# Patient Record
Sex: FEMALE | Race: WHITE | URBAN - METROPOLITAN AREA
[De-identification: names, ages, dates, MRNs, and addresses within clinical notes are randomized per-mention and may not be internally consistent; named-entity substitution may affect disease eponyms.]

---

## 2024-04-26 ENCOUNTER — INPATIENT (INPATIENT)
Facility: HOSPITAL | Age: 69
LOS: 0 days | End: 2024-04-26
Attending: INTERNAL MEDICINE | Admitting: INTERNAL MEDICINE
Payer: MEDICARE

## 2024-04-26 VITALS
HEART RATE: 125 BPM | WEIGHT: 132.28 LBS | OXYGEN SATURATION: 50 % | RESPIRATION RATE: 14 BRPM | DIASTOLIC BLOOD PRESSURE: 82 MMHG | SYSTOLIC BLOOD PRESSURE: 108 MMHG

## 2024-04-26 VITALS — RESPIRATION RATE: 31 BRPM | OXYGEN SATURATION: 64 % | HEART RATE: 60 BPM

## 2024-04-26 DIAGNOSIS — I46.9 CARDIAC ARREST, CAUSE UNSPECIFIED: ICD-10-CM

## 2024-04-26 LAB
A1C WITH ESTIMATED AVERAGE GLUCOSE RESULT: 5.4 % — SIGNIFICANT CHANGE UP (ref 4–5.6)
ADD ON TEST-SPECIMEN IN LAB: SIGNIFICANT CHANGE UP
ALBUMIN SERPL ELPH-MCNC: 2 G/DL — LOW (ref 3.3–5)
ALBUMIN SERPL ELPH-MCNC: 3.9 G/DL — SIGNIFICANT CHANGE UP (ref 3.3–5)
ALP SERPL-CCNC: 178 U/L — HIGH (ref 40–120)
ALP SERPL-CCNC: 219 U/L — HIGH (ref 40–120)
ALT FLD-CCNC: 175 U/L — HIGH (ref 4–33)
ALT FLD-CCNC: 23 U/L — SIGNIFICANT CHANGE UP (ref 4–33)
ANION GAP SERPL CALC-SCNC: 28 MMOL/L — HIGH (ref 7–14)
ANION GAP SERPL CALC-SCNC: 33 MMOL/L — HIGH (ref 7–14)
APTT BLD: 62.5 SEC — HIGH (ref 24.5–35.6)
APTT BLD: >200 SEC — CRITICAL HIGH (ref 24.5–35.6)
AST SERPL-CCNC: 277 U/L — HIGH (ref 4–32)
AST SERPL-CCNC: 32 U/L — SIGNIFICANT CHANGE UP (ref 4–32)
BASE EXCESS BLDV CALC-SCNC: SIGNIFICANT CHANGE UP MMOL/L (ref -2–3)
BASOPHILS # BLD AUTO: 0.23 K/UL — HIGH (ref 0–0.2)
BASOPHILS NFR BLD AUTO: 1.6 % — SIGNIFICANT CHANGE UP (ref 0–2)
BILIRUB SERPL-MCNC: 0.3 MG/DL — SIGNIFICANT CHANGE UP (ref 0.2–1.2)
BILIRUB SERPL-MCNC: 0.4 MG/DL — SIGNIFICANT CHANGE UP (ref 0.2–1.2)
BLD GP AB SCN SERPL QL: NEGATIVE — SIGNIFICANT CHANGE UP
BLOOD GAS ARTERIAL COMPREHENSIVE RESULT: SIGNIFICANT CHANGE UP
BLOOD GAS VENOUS COMPREHENSIVE RESULT: SIGNIFICANT CHANGE UP
BLOOD GAS VENOUS COMPREHENSIVE RESULT: SIGNIFICANT CHANGE UP
BUN SERPL-MCNC: 12 MG/DL — SIGNIFICANT CHANGE UP (ref 7–23)
BUN SERPL-MCNC: 13 MG/DL — SIGNIFICANT CHANGE UP (ref 7–23)
CALCIUM SERPL-MCNC: 10 MG/DL — SIGNIFICANT CHANGE UP (ref 8.4–10.5)
CALCIUM SERPL-MCNC: 14.6 MG/DL — CRITICAL HIGH (ref 8.4–10.5)
CHLORIDE BLDV-SCNC: 105 MMOL/L — SIGNIFICANT CHANGE UP (ref 96–108)
CHLORIDE SERPL-SCNC: 104 MMOL/L — SIGNIFICANT CHANGE UP (ref 98–107)
CHLORIDE SERPL-SCNC: 109 MMOL/L — HIGH (ref 98–107)
CO2 BLDV-SCNC: SIGNIFICANT CHANGE UP MMOL/L (ref 22–26)
CO2 SERPL-SCNC: 16 MMOL/L — LOW (ref 22–31)
CO2 SERPL-SCNC: 8 MMOL/L — CRITICAL LOW (ref 22–31)
CREAT SERPL-MCNC: 0.68 MG/DL — SIGNIFICANT CHANGE UP (ref 0.5–1.3)
CREAT SERPL-MCNC: 0.78 MG/DL — SIGNIFICANT CHANGE UP (ref 0.5–1.3)
EGFR: 85 ML/MIN/1.73M2 — SIGNIFICANT CHANGE UP
EGFR: 97 ML/MIN/1.73M2 — SIGNIFICANT CHANGE UP
EOSINOPHIL # BLD AUTO: 0.15 K/UL — SIGNIFICANT CHANGE UP (ref 0–0.5)
EOSINOPHIL NFR BLD AUTO: 1 % — SIGNIFICANT CHANGE UP (ref 0–6)
ESTIMATED AVERAGE GLUCOSE: 108 — SIGNIFICANT CHANGE UP
GAS PNL BLDV: 139 MMOL/L — SIGNIFICANT CHANGE UP (ref 136–145)
GAS PNL BLDV: SIGNIFICANT CHANGE UP
GLUCOSE BLDV-MCNC: 215 MG/DL — HIGH (ref 70–99)
GLUCOSE SERPL-MCNC: 266 MG/DL — HIGH (ref 70–99)
GLUCOSE SERPL-MCNC: 295 MG/DL — HIGH (ref 70–99)
HCO3 BLDV-SCNC: SIGNIFICANT CHANGE UP MMOL/L (ref 22–29)
HCT VFR BLD CALC: 27.1 % — LOW (ref 34.5–45)
HCT VFR BLD CALC: 40.5 % — SIGNIFICANT CHANGE UP (ref 34.5–45)
HCT VFR BLDA CALC: 34 % — LOW (ref 34.5–46.5)
HGB BLD CALC-MCNC: 11.3 G/DL — LOW (ref 11.7–16.1)
HGB BLD-MCNC: 11.2 G/DL — LOW (ref 11.5–15.5)
HGB BLD-MCNC: 8.1 G/DL — LOW (ref 11.5–15.5)
IANC: 7.18 K/UL — SIGNIFICANT CHANGE UP (ref 1.8–7.4)
IANC: 9.78 K/UL — HIGH (ref 1.8–7.4)
IMM GRANULOCYTES NFR BLD AUTO: 4.8 % — HIGH (ref 0–0.9)
INR BLD: 1.08 RATIO — SIGNIFICANT CHANGE UP (ref 0.85–1.18)
INR BLD: 2.44 RATIO — HIGH (ref 0.85–1.18)
LACTATE BLDV-MCNC: 15.6 MMOL/L — CRITICAL HIGH (ref 0.5–2)
LYMPHOCYTES # BLD AUTO: 34 % — SIGNIFICANT CHANGE UP (ref 13–44)
LYMPHOCYTES # BLD AUTO: 4.99 K/UL — HIGH (ref 1–3.3)
MAGNESIUM SERPL-MCNC: 3.8 MG/DL — HIGH (ref 1.6–2.6)
MCHC RBC-ENTMCNC: 24.1 PG — LOW (ref 27–34)
MCHC RBC-ENTMCNC: 24.6 PG — LOW (ref 27–34)
MCHC RBC-ENTMCNC: 27.7 GM/DL — LOW (ref 32–36)
MCHC RBC-ENTMCNC: 29.9 GM/DL — LOW (ref 32–36)
MCV RBC AUTO: 82.4 FL — SIGNIFICANT CHANGE UP (ref 80–100)
MCV RBC AUTO: 87.3 FL — SIGNIFICANT CHANGE UP (ref 80–100)
MONOCYTES # BLD AUTO: 1.41 K/UL — HIGH (ref 0–0.9)
MONOCYTES NFR BLD AUTO: 9.6 % — SIGNIFICANT CHANGE UP (ref 2–14)
NEUTROPHILS # BLD AUTO: 7.18 K/UL — SIGNIFICANT CHANGE UP (ref 1.8–7.4)
NEUTROPHILS NFR BLD AUTO: 49 % — SIGNIFICANT CHANGE UP (ref 43–77)
NEUTROPHILS NFR BLD AUTO: SIGNIFICANT CHANGE UP % (ref 43–77)
NRBC # BLD: 1 /100 WBCS — HIGH (ref 0–0)
NRBC # FLD: 0.17 K/UL — HIGH (ref 0–0)
NT-PROBNP SERPL-SCNC: 3486 PG/ML — HIGH
PCO2 BLDV: >125 MMHG — HIGH (ref 39–52)
PH BLDV: <6.9 — LOW (ref 7.32–7.43)
PHOSPHATE SERPL-MCNC: 13.6 MG/DL — HIGH (ref 2.5–4.5)
PLATELET # BLD AUTO: 154 K/UL — SIGNIFICANT CHANGE UP (ref 150–400)
PLATELET # BLD AUTO: 472 K/UL — HIGH (ref 150–400)
PO2 BLDV: 34 MMHG — SIGNIFICANT CHANGE UP (ref 25–45)
POTASSIUM BLDV-SCNC: 5.5 MMOL/L — HIGH (ref 3.5–5.1)
POTASSIUM SERPL-MCNC: 4.9 MMOL/L — SIGNIFICANT CHANGE UP (ref 3.5–5.3)
POTASSIUM SERPL-MCNC: 5.2 MMOL/L — SIGNIFICANT CHANGE UP (ref 3.5–5.3)
POTASSIUM SERPL-SCNC: 4.9 MMOL/L — SIGNIFICANT CHANGE UP (ref 3.5–5.3)
POTASSIUM SERPL-SCNC: 5.2 MMOL/L — SIGNIFICANT CHANGE UP (ref 3.5–5.3)
PROT SERPL-MCNC: 3.1 G/DL — LOW (ref 6–8.3)
PROT SERPL-MCNC: 5.8 G/DL — LOW (ref 6–8.3)
PROTHROM AB SERPL-ACNC: 12.2 SEC — SIGNIFICANT CHANGE UP (ref 9.5–13)
PROTHROM AB SERPL-ACNC: 26.9 SEC — HIGH (ref 9.5–13)
RBC # BLD: 3.29 M/UL — LOW (ref 3.8–5.2)
RBC # BLD: 4.64 M/UL — SIGNIFICANT CHANGE UP (ref 3.8–5.2)
RBC # FLD: 15.1 % — HIGH (ref 10.3–14.5)
RBC # FLD: 15.2 % — HIGH (ref 10.3–14.5)
RH IG SCN BLD-IMP: POSITIVE — SIGNIFICANT CHANGE UP
SAO2 % BLDV: 18.8 % — LOW (ref 67–88)
SODIUM SERPL-SCNC: 145 MMOL/L — SIGNIFICANT CHANGE UP (ref 135–145)
SODIUM SERPL-SCNC: 153 MMOL/L — HIGH (ref 135–145)
T4 FREE SERPL-MCNC: 0.5 NG/DL — LOW (ref 0.9–1.8)
T4 FREE+ TSH PNL SERPL: 11.94 UIU/ML — HIGH (ref 0.27–4.2)
TROPONIN T, HIGH SENSITIVITY RESULT: 22 NG/L — SIGNIFICANT CHANGE UP
TSH SERPL-MCNC: 11.38 UIU/ML — HIGH (ref 0.27–4.2)
WBC # BLD: 14.67 K/UL — HIGH (ref 3.8–10.5)
WBC # BLD: 17.48 K/UL — HIGH (ref 3.8–10.5)
WBC # FLD AUTO: 14.67 K/UL — HIGH (ref 3.8–10.5)
WBC # FLD AUTO: 17.48 K/UL — HIGH (ref 3.8–10.5)

## 2024-04-26 PROCEDURE — 33210 INSERT ELECTRD/PM CATH SNGL: CPT

## 2024-04-26 PROCEDURE — 31500 INSERT EMERGENCY AIRWAY: CPT

## 2024-04-26 PROCEDURE — 33967 INSERT I-AORT PERCUT DEVICE: CPT

## 2024-04-26 PROCEDURE — 71045 X-RAY EXAM CHEST 1 VIEW: CPT | Mod: 26

## 2024-04-26 PROCEDURE — 93460 R&L HRT ART/VENTRICLE ANGIO: CPT | Mod: 26

## 2024-04-26 PROCEDURE — 99291 CRITICAL CARE FIRST HOUR: CPT | Mod: GC

## 2024-04-26 PROCEDURE — 99292 CRITICAL CARE ADDL 30 MIN: CPT | Mod: 25

## 2024-04-26 PROCEDURE — 99291 CRITICAL CARE FIRST HOUR: CPT | Mod: 25

## 2024-04-26 RX ORDER — DEXTROSE 50 % IN WATER 50 %
12.5 SYRINGE (ML) INTRAVENOUS ONCE
Refills: 0 | Status: DISCONTINUED | OUTPATIENT
Start: 2024-04-26 | End: 2024-04-26

## 2024-04-26 RX ORDER — CHLORHEXIDINE GLUCONATE 213 G/1000ML
15 SOLUTION TOPICAL EVERY 12 HOURS
Refills: 0 | Status: DISCONTINUED | OUTPATIENT
Start: 2024-04-26 | End: 2024-04-26

## 2024-04-26 RX ORDER — NOREPINEPHRINE BITARTRATE/D5W 8 MG/250ML
0.05 PLASTIC BAG, INJECTION (ML) INTRAVENOUS
Qty: 16 | Refills: 0 | Status: DISCONTINUED | OUTPATIENT
Start: 2024-04-26 | End: 2024-04-26

## 2024-04-26 RX ORDER — HEPARIN SODIUM 5000 [USP'U]/ML
4000 INJECTION INTRAVENOUS; SUBCUTANEOUS ONCE
Refills: 0 | Status: COMPLETED | OUTPATIENT
Start: 2024-04-26 | End: 2024-04-26

## 2024-04-26 RX ORDER — SODIUM BICARBONATE 1 MEQ/ML
50 SYRINGE (ML) INTRAVENOUS ONCE
Refills: 0 | Status: DISCONTINUED | OUTPATIENT
Start: 2024-04-26 | End: 2024-04-26

## 2024-04-26 RX ORDER — FENTANYL CITRATE 50 UG/ML
0.5 INJECTION INTRAVENOUS
Qty: 2500 | Refills: 0 | Status: DISCONTINUED | OUTPATIENT
Start: 2024-04-26 | End: 2024-04-26

## 2024-04-26 RX ORDER — HEPARIN SODIUM 5000 [USP'U]/ML
INJECTION INTRAVENOUS; SUBCUTANEOUS
Qty: 25000 | Refills: 0 | Status: DISCONTINUED | OUTPATIENT
Start: 2024-04-26 | End: 2024-04-26

## 2024-04-26 RX ORDER — EPINEPHRINE 0.3 MG/.3ML
1 INJECTION INTRAMUSCULAR; SUBCUTANEOUS
Qty: 4 | Refills: 0 | Status: DISCONTINUED | OUTPATIENT
Start: 2024-04-26 | End: 2024-04-26

## 2024-04-26 RX ORDER — DEXTROSE 50 % IN WATER 50 %
25 SYRINGE (ML) INTRAVENOUS ONCE
Refills: 0 | Status: DISCONTINUED | OUTPATIENT
Start: 2024-04-26 | End: 2024-04-26

## 2024-04-26 RX ORDER — DEXTROSE 10 % IN WATER 10 %
125 INTRAVENOUS SOLUTION INTRAVENOUS ONCE
Refills: 0 | Status: DISCONTINUED | OUTPATIENT
Start: 2024-04-26 | End: 2024-04-26

## 2024-04-26 RX ORDER — INSULIN HUMAN 100 [IU]/ML
5 INJECTION, SOLUTION SUBCUTANEOUS ONCE
Refills: 0 | Status: COMPLETED | OUTPATIENT
Start: 2024-04-26 | End: 2024-04-26

## 2024-04-26 RX ORDER — HEPARIN SODIUM 5000 [USP'U]/ML
3500 INJECTION INTRAVENOUS; SUBCUTANEOUS EVERY 6 HOURS
Refills: 0 | Status: DISCONTINUED | OUTPATIENT
Start: 2024-04-26 | End: 2024-04-26

## 2024-04-26 RX ORDER — BUMETANIDE 0.25 MG/ML
4 INJECTION INTRAMUSCULAR; INTRAVENOUS ONCE
Refills: 0 | Status: DISCONTINUED | OUTPATIENT
Start: 2024-04-26 | End: 2024-04-26

## 2024-04-26 RX ORDER — PROPOFOL 10 MG/ML
1 INJECTION, EMULSION INTRAVENOUS
Qty: 1000 | Refills: 0 | Status: DISCONTINUED | OUTPATIENT
Start: 2024-04-26 | End: 2024-04-26

## 2024-04-26 RX ORDER — MIDAZOLAM HYDROCHLORIDE 1 MG/ML
2 INJECTION, SOLUTION INTRAMUSCULAR; INTRAVENOUS ONCE
Refills: 0 | Status: COMPLETED | OUTPATIENT
Start: 2024-04-26 | End: 2024-04-26

## 2024-04-26 RX ORDER — INSULIN LISPRO 100/ML
VIAL (ML) SUBCUTANEOUS EVERY 6 HOURS
Refills: 0 | Status: DISCONTINUED | OUTPATIENT
Start: 2024-04-26 | End: 2024-04-26

## 2024-04-26 RX ORDER — HEPARIN SODIUM 5000 [USP'U]/ML
3500 INJECTION INTRAVENOUS; SUBCUTANEOUS ONCE
Refills: 0 | Status: COMPLETED | OUTPATIENT
Start: 2024-04-26 | End: 2024-04-26

## 2024-04-26 RX ORDER — HEPARIN SODIUM 5000 [USP'U]/ML
800 INJECTION INTRAVENOUS; SUBCUTANEOUS
Qty: 25000 | Refills: 0 | Status: DISCONTINUED | OUTPATIENT
Start: 2024-04-26 | End: 2024-04-26

## 2024-04-26 RX ORDER — DEXTROSE 50 % IN WATER 50 %
15 SYRINGE (ML) INTRAVENOUS ONCE
Refills: 0 | Status: DISCONTINUED | OUTPATIENT
Start: 2024-04-26 | End: 2024-04-26

## 2024-04-26 RX ORDER — GLUCAGON INJECTION, SOLUTION 0.5 MG/.1ML
1 INJECTION, SOLUTION SUBCUTANEOUS ONCE
Refills: 0 | Status: DISCONTINUED | OUTPATIENT
Start: 2024-04-26 | End: 2024-04-26

## 2024-04-26 RX ORDER — NOREPINEPHRINE BITARTRATE/D5W 8 MG/250ML
0.05 PLASTIC BAG, INJECTION (ML) INTRAVENOUS
Qty: 8 | Refills: 0 | Status: DISCONTINUED | OUTPATIENT
Start: 2024-04-26 | End: 2024-04-26

## 2024-04-26 RX ORDER — DOBUTAMINE HCL 250MG/20ML
5 VIAL (ML) INTRAVENOUS
Qty: 500 | Refills: 0 | Status: DISCONTINUED | OUTPATIENT
Start: 2024-04-26 | End: 2024-04-26

## 2024-04-26 RX ORDER — SODIUM CHLORIDE 9 MG/ML
1000 INJECTION, SOLUTION INTRAVENOUS
Refills: 0 | Status: DISCONTINUED | OUTPATIENT
Start: 2024-04-26 | End: 2024-04-26

## 2024-04-26 RX ORDER — CISATRACURIUM BESYLATE 2 MG/ML
3 INJECTION INTRAVENOUS
Qty: 200 | Refills: 0 | Status: DISCONTINUED | OUTPATIENT
Start: 2024-04-26 | End: 2024-04-26

## 2024-04-26 RX ORDER — MIDAZOLAM HYDROCHLORIDE 1 MG/ML
0.02 INJECTION, SOLUTION INTRAMUSCULAR; INTRAVENOUS
Qty: 100 | Refills: 0 | Status: DISCONTINUED | OUTPATIENT
Start: 2024-04-26 | End: 2024-04-26

## 2024-04-26 RX ORDER — CISATRACURIUM BESYLATE 2 MG/ML
20 INJECTION INTRAVENOUS ONCE
Refills: 0 | Status: DISCONTINUED | OUTPATIENT
Start: 2024-04-26 | End: 2024-04-26

## 2024-04-26 RX ORDER — SODIUM BICARBONATE 1 MEQ/ML
0.38 SYRINGE (ML) INTRAVENOUS
Qty: 150 | Refills: 0 | Status: DISCONTINUED | OUTPATIENT
Start: 2024-04-26 | End: 2024-04-26

## 2024-04-26 RX ADMIN — HEPARIN SODIUM 4000 UNIT(S): 5000 INJECTION INTRAVENOUS; SUBCUTANEOUS at 10:10

## 2024-04-26 RX ADMIN — INSULIN HUMAN 5 UNIT(S): 100 INJECTION, SOLUTION SUBCUTANEOUS at 10:10

## 2024-04-26 RX ADMIN — Medication 7.5 MICROGRAM(S)/KG/MIN: at 11:10

## 2024-04-26 RX ADMIN — EPINEPHRINE 300 MICROGRAM(S)/KG/MIN: 0.3 INJECTION INTRAMUSCULAR; SUBCUTANEOUS at 11:13

## 2024-04-26 RX ADMIN — Medication 10.7 MICROGRAM(S)/KG/MIN: at 11:44

## 2024-04-26 RX ADMIN — FENTANYL CITRATE 4 MICROGRAM(S)/KG/HR: 50 INJECTION INTRAVENOUS at 11:12

## 2024-04-26 RX ADMIN — Medication 150 MEQ/KG/HR: at 11:41

## 2024-04-26 RX ADMIN — HEPARIN SODIUM 800 UNIT(S)/HR: 5000 INJECTION INTRAVENOUS; SUBCUTANEOUS at 11:40

## 2024-04-26 NOTE — ED ADULT TRIAGE NOTE - CCCP TRG CHIEF CMPLNT
Bedside and Verbal shift change report given to KIMBERLEY Mishra RN  by Julio César Gold RN . Report given with Christin BURDEN and MAR. cardiac arrest in field

## 2024-04-26 NOTE — H&P ADULT - NSHPPHYSICALEXAM_GEN_ALL_CORE
Appearance: intubated and sedated  HEENT:   Fixed and nonreactive	  Cardiovascular: Normal S1 S2, No JVD, No murmurs, No edema  Respiratory: B/L rhonchi	  Psychiatry: sedated  Gastrointestinal:  Soft, Non-tender, + BS	  Skin: No rashes, No ecchymoses, No cyanosis	  Neurologic: sedated  Extremities:  No clubbing, cyanosis or edema  Vascular: Peripheral pulses palpable 2+ bilaterally

## 2024-04-26 NOTE — CHART NOTE - NSCHARTNOTEFT_GEN_A_CORE
Ms. Do is a 64 yo woman w/ T2DM and significant smoking hx who presented to the hospital after being found down w/ cardiac arrest.    Per report, pt had been complaining of SOB and called 911. When EMS arrived pt was found down, pulseless. CPR was initiated and pt was brought to the [facility] ED where she was still coding, and eventually intubated w/ subsequent ROSC. She had multiple PEA and VT codes. She was started on NorEpi, Epi and . An EKG obtained after ROSC showed MULU anteriorly so she was started on a heparin gtt and brought to the cath lab where she became bradycardic and hypotensive. She had a TVP, IABP, and swan placed in the cath lab showing elevated filling pressures, w/ CI ~3 while on pressor/inotrope/IABP support. LHC showed severe triple vessel disease w/ no focal occlusion/culprit lesion, likely c/w spontaneous recannulization of the artery. She was subsequently brought to the CCU for further care. On arrival she was hypoxic on the ventilator requiring intermittent bagging, hypotensive requiring max pressors and addition of Vaso, and was found to be profoundly acidotic w/ pH <6.9, lactate >17, pCO2 >80 c/f mixed respiratory and metabolic acidosis. She had pink frothy output from her ET tube as well. She was given multiple pushes of bicarb and started on a bicarb drip as well. Overall the picture was concerning for profound organ ischemia from likely prolonged down time and likely significant pulm edema leading to resp acidosis and difficulty w/ ventilation/oxygenation. A shock call was initiated where she was deemed too unstable at this time for increased MCS, and she at that point had not had any neurological status even off sedation.     The pt's brother and the pts best friend were called during this process and came to the hospital. We had a long discussion at the bedside about her ultimate prognosis, max pressor support, and ultimate signs of multi-organ failure that were overall concerning. Family decided at this time to make the pt DNR/DNI and to not further escalate care, including no further pushes of medications while on pressors. Pt became hypotensive shortly after this discussion and ultimately passed away. She was pronounced dead at 2:42pm.

## 2024-04-26 NOTE — PROVIDER CONTACT NOTE (EICU) - BACKGROUND
~62 yo F active smoker, unknown medical history, a/w cardiac arrest.  As per ED and H&P, pt called called EMS, noted to collapse outside on lawn and CPR initiated at approx 938, by EMS.  Intubated in the field.  In ED, Pt had episodes of PEA alternating with episodes with a pulse and episodes of Vtach.  Pt with severe metabolic derangement treated with with bicarb and calcium and insulin and amiodarone.  EKG showed acute STEMI, cath lab activated by ED team.      Pt urgently brought to cath lab.  Now s/p [facility], noted to have triple vessel disease with no acute blockage.  No intervention performed in cath lab.  Levophed and epi were required during cath and eventually dobutamine to increase cardiac squeeze.    IABP and TVP placed for support.  EF noted to be 15% on bedside POCUS as per CCU team.

## 2024-04-26 NOTE — PROVIDER CONTACT NOTE (EICU) - SITUATION
Shock team called activated by Dr. Dmitriy Herrera  Participants  Dr. Dmitriy Herrera, Dr. Hyman, Dr. Lamonte Goode and Dr. Huang Puga.     TeleICU: Dr. Reaves

## 2024-04-26 NOTE — ED PROVIDER NOTE - ATTENDING CONTRIBUTION TO CARE
stanley: Patient presented in cardiac arrest BLS in progress.  EMS reported that there was a cough or shortness of breath and they found the patient collapsed outside of the home.  The home was at [address] [place] [address].  As per neighbors she had just moved in.  She had no id  on her.    Patient is currently 58-yvb-fmaq-old woman.  Given she was patient was pulseless and not breathing on her own.  As per EMS they believe that she went down about 910 or 915.  She was intubated and ACLS was begun when she entered the emergency department.  Please look at ACLS timing she but she had episodes of PEA alternating with episodes with a pulse and episodes of V. tach.  She had treatment for V. tach with amiodarone for PEA we treated for presumed hyperkalemia with bicarb and calcium and insulin.  Sugar was in the 350 range.  And when she was pulseless she also received epi.  She had good lung motion so unlikely pneumothorax on ultrasound of her heart she had poor squeeze.  EKG done 939 showed acute STEMI cardiology was called.      In the meantime I used the address to reverse lookup and found someone who just recently bought a house at [address] [place] [address].  I was able to find the  who could be in contact with the [place] agent.  Real state agent Philomena arreguin ER now [phone] given the name of the person she sold the house to was Noa Jarquin.  I provided a physical description of the patient being that she had gray very curly hair was slim and the  said that that sounded like the person that she had sold the house to.  I was able get phone number for a brother of the person who brought the house Harinder alanis.  I informed him that his phone number at [phone] and also had his cell #[phone] that we had someone who we suspected was his sister in the emergency department who had had a cardiac arrest.  I was very clear that it may or may not be his sister but based on the information I had received it was likely.  He confirmed the physical description of his sister.  He said if it is his sister she had not been to the doctor in years she was a long-term smoker no known medical history because she had not been to a doctor in years.    After episodes of hypotension adding Levophed epi and eventually dobutamine to increase cardiac squeeze and episodes of loss of pulse the patient was able to maintain pulse long enough for cardiology to bring her to the Cath Lab.    Patient unresponsive upon presentation intubated upon presentation, quired meds for intubation    h at/nc  Mildly cyanotic   lungs clear to asno wheeze  abd soft no r/g/t  ext no edema no deformities, mildly mottled  neueo agonal breath   Vital signs as noted on code sheet    Patient brought to Cath Lab by cardiology  multiple ecg read by me in real time, rhytm strips.,  ecg 939 stemi, ant. sr.    Upon my evaluation, this patient had a high probability of imminent or life-threatening deterioration due to cardiac arrest which required my direct attention, intervention, and personal management.  The patient has a  medical condition that impairs one or more vital organ systems.  Frequent personal assessment and adjustment of medical interventions was performed.      I have personally provided 90_ minutes of critical care time exclusive of time spent on separately billable procedures. Time includes review of laboratory data, radiology results, discussion with consultants, patient and family; monitoring for potential decompensation, as well as time spent retrieving data and reviewing the chart and documenting the visit. Interventions were performed as documented above.

## 2024-04-26 NOTE — DISCHARGE NOTE FOR THE EXPIRED PATIENT - HOSPITAL COURSE
69 yo female  PMH DM, active smoker, BIBEMS for cardiac arrest 20 mins prior to arrival. Patient called EMS for sob. Patient found down by  EMS. BLS started, In ED, pt  intubated and ACLS initiated. Pt had episodes of PEA alternating with episodes with a pulse and episodes of Vtach.  Pt with severe metabolic derangement treated with  bicarb, calcium,  insulin and amiodarone.  Pressure support  with epinephrine and norepinephrine. EKG showed acute STEMI .Pt urgently brought to cath lab  S/p LHC pt found to have TVD without blockage. No intervention done.   Pt admitted to CCU s/p LHC: mLAD 80%, ostial 100%, L--L collat, pRCA 80%, mRCA 80-90, and WCW  - 36-40CO 6.4, CI 3.69, PA sat  57, hg 7-9 On Levo and epi. IABP 1:1 On pressors.   Pressure support requirements with epinephrine and norepinephrine increasing. Metabolic acidosis increasing. Lactic acid 17. Pt placed on bicarb gtt with multipl bicarb pushes. Pt placed on dobutamine, bumex bolus and bumex gtt.. sedation with fentanyl and propofol. Pt' s ventilatory support requirements increasing to , resp 35, fi02 100% and peep of 20. Pulmonary consulted for support. Nimbex gtt initiated. Nephrology consulted for no urine output.  Case discussed with next of kin, brother Harinder who requested a  for anointment of the sick. He also requested DNR   Patient stopped breathing and cardiac monitor showed asystole. On  exam no spontaneous movements were present. There was no response to verbal or tactile stimuli. Pupils were mid-dilated and fixed. No breath sounds were appreciated over either lung field. No carotid pulses were palpable. No heart sounds were auscultated over entire precordium. Patient pronounced dead at 14:24. Brother at bedside and declined autopsy.

## 2024-04-26 NOTE — CONSULT NOTE ADULT - ASSESSMENT
67 yo female  PMH DM, active smoker, BIBEMS for cardiac arrest 20 mins prior to arrival reported to be PEA and VT/VF.   69 yo female  PMH DM, active smoker, BIBEMS for cardiac arrest 20 mins prior to arrival reported to be PEA and VT/VF. LHC with triple vessel disease. RHC with elevation in PCWP to 36 on NE, epi, and . IABP was placed and patient admitted to CCU. Pulmonary consulted for severe hypoxemic respiratory failure.     69 yo female  PMH DM, active smoker, BIBEMS for cardiac arrest 20 mins prior to arrival reported to be PEA and VT/VF. LHC with triple vessel disease. RHC with elevation in PCWP to 36 on NE, epi, and . IABP was placed and patient admitted to CCU. Pulmonary consulted for severe hypoxemic respiratory failure.  CXR with diffuse opacities consistent with pulmonary edema. Bedside US done w/ cardiology team at bedside with severe reduction in LVSF to 10%. No evidence of RV overload on visual evaluation.  Severe respiratory and AG metabolic acidosis noted. Ventilator set at VC 35/350/20/100. Plateau of 37 and driving pressure of 17. Minimal urine output with bumex drip. NM blockade initiated with some improvement in spo2 however patient required frequent bag ventilation. Patient noted to have refractory shock requiring multiple vasopressors, inotropes, and bicarbonate pushes. After an extensive goc discussion, the family favored no further escalation of care and requested a DNR/DNI status. The patient ultimately passed. A very sad and unfortunate case. Emotional support provided.

## 2024-04-26 NOTE — CONSULT NOTE ADULT - SUBJECTIVE AND OBJECTIVE BOX
PULMONARY SERVICE INITIAL CONSULT NOTE    HPI:  69 yo female  PMH DM, active smoker, BIBEMS for cardiac arrest 20 mins prior to arrival. Patient called EMS for sob. Patient found down by  EMS. BLS started, In ED, pt  intubated and ACLS initiated. Pt had episodes of PEA alternating with episodes with a pulse and episodes of Vtach.  Pt with severe metabolic derangement treated with  bicarb, calcium,  insulin and amiodarone.  Pressure support  with epinephrine and norepinephrine. EKG showed acute STEMI .Pt urgently brought to cath lab  S/p LHC pt found to have TVD without blockage. No intervention done.   Pt admitted to CCU s/p LHC: mLAD 80%, ostial 100%, L--L collat, pRCA 80%, mRCA 80-90, and WCW  - 36-40CO 6.4, CI 3.69, PA sat  57, hg 7-9 On Levo and epi. IABP 1:1 On pressores  REVIEW OF SYSTEMS:  Limited by critical illness.    PAST MEDICAL & SURGICAL HISTORY:  No pertinent past medical history      No significant past surgical history          FAMILY HISTORY:  No pertinent family history in first degree relatives           MEDICATIONS:  Pulmonary:    Antimicrobials:    Anticoagulants:  heparin  Infusion 800 Unit(s)/Hr IV Continuous <Continuous>    Onc:    GI/:    Endocrine:  dextrose 50% Injectable 12.5 Gram(s) IV Push once  dextrose 50% Injectable 25 Gram(s) IV Push once  dextrose Oral Gel 15 Gram(s) Oral once PRN  glucagon  Injectable 1 milliGRAM(s) IntraMuscular once  insulin lispro (ADMELOG) corrective regimen sliding scale   SubCutaneous every 6 hours    Cardiac:  buMETAnide IVPB 4 milliGRAM(s) IV Intermittent once  DOBUTamine Infusion 5 MICROgram(s)/kG/Min IV Continuous <Continuous>  EPINEPHrine    Infusion 1 MICROgram(s)/kG/Min IV Continuous <Continuous>  norepinephrine Infusion 0.05 MICROgram(s)/kG/Min IV Continuous <Continuous>    Other Medications:  chlorhexidine 0.12% Liquid 15 milliLiter(s) Oral Mucosa every 12 hours  cisatracurium Infusion 3 MICROgram(s)/kG/Min IV Continuous <Continuous>  cisatracurium Injectable 20 milliGRAM(s) IV Push once  dextrose 10% Bolus 125 milliLiter(s) IV Bolus once  dextrose 5%. 1000 milliLiter(s) IV Continuous <Continuous>  dextrose 5%. 1000 milliLiter(s) IV Continuous <Continuous>  fentaNYL   Infusion. 0.5 MICROgram(s)/kG/Hr IV Continuous <Continuous>  midazolam Infusion 0.02 mG/kG/Hr IV Continuous <Continuous>  propofol Infusion 1 MICROgram(s)/kG/Min IV Continuous <Continuous>  sodium bicarbonate  Infusion 0.375 mEq/kG/Hr IV Continuous <Continuous>  sodium bicarbonate  Injectable 50 milliEquivalent(s) IV Push once  sodium bicarbonate  Injectable 50 milliEquivalent(s) IV Push once  sodium bicarbonate  Injectable 50 milliEquivalent(s) IV Push once  sodium bicarbonate  Injectable 50 milliEquivalent(s) IV Push once      Allergies    Allergy Status Unknown    Intolerances        PHYSICAL EXAM  Vital Signs Last 24 Hrs  T(C): 34.2 (26 Apr 2024 12:12), Max: 34.2 (26 Apr 2024 10:19)  T(F): 93.6 (26 Apr 2024 12:12), Max: 93.6 (26 Apr 2024 10:19)  HR: 60 (26 Apr 2024 14:15) (0 - 125)  BP: 0/0 (26 Apr 2024 12:12) (0/0 - 164/21)  BP(mean): 84 (26 Apr 2024 10:30) (43 - 100)  RR: 31 (26 Apr 2024 14:15) (0 - 35)  SpO2: 64% (26 Apr 2024 14:15) (50% - 100%)    Parameters below as of 26 Apr 2024 12:12  Patient On (Oxygen Delivery Method): room air        Mode: AC/ CMV (Assist Control/ Continuous Mandatory Ventilation)  RR (machine): 35  TV (machine): 350  FiO2: 100  PEEP: 20  ITime: 0.6  MAP: 27  PIP: 42      CONSTITUTIONAL: sedated   Cardiovascular: RRR with no murmurs. No JVD noted. 1+ lower extremity edema B/L.   Respiratory: Rales throughout.   Gastrointestinal:  Soft, nondistended.  Skin:  mottling noted of the knees.      LABS:  ABG - ( 26 Apr 2024 12:30 )  pH, Arterial: 6.93  pH, Blood: x     /  pCO2: 83    /  pO2: 123   / HCO3: 17    / Base Excess: -14.6 /  SaO2: 99.0                CBC Full  -  ( 26 Apr 2024 12:34 )  WBC Count : 17.48 K/uL  RBC Count : 3.29 M/uL  Hemoglobin : 8.1 g/dL  Hematocrit : 27.1 %  Platelet Count - Automated : 154 K/uL  Mean Cell Volume : 82.4 fL  Mean Cell Hemoglobin : 24.6 pg  Mean Cell Hemoglobin Concentration : 29.9 gm/dL  Auto Neutrophil # : x  Auto Lymphocyte # : x  Auto Monocyte # : x  Auto Eosinophil # : x  Auto Basophil # : x  Auto Neutrophil % : x  Auto Lymphocyte % : x  Auto Monocyte % : x  Auto Eosinophil % : x  Auto Basophil % : x    04-26    153<H>  |  109<H>  |  12  ----------------------------<  295<H>  4.9   |  16<L>  |  0.68    Ca    14.6<HH>      26 Apr 2024 12:34  Phos  13.6     04-26  Mg     3.80     04-26    TPro  3.1<L>  /  Alb  2.0<L>  /  TBili  0.4  /  DBili  x   /  AST  277<H>  /  ALT  175<H>  /  AlkPhos  219<H>  04-26    PT/INR - ( 26 Apr 2024 12:34 )   PT: 26.9 sec;   INR: 2.44 ratio         PTT - ( 26 Apr 2024 12:34 )  PTT:>200.0 sec      Urinalysis Basic - ( 26 Apr 2024 12:34 )    Color: x / Appearance: x / SG: x / pH: x  Gluc: 295 mg/dL / Ketone: x  / Bili: x / Urobili: x   Blood: x / Protein: x / Nitrite: x   Leuk Esterase: x / RBC: x / WBC x   Sq Epi: x / Non Sq Epi: x / Bacteria: x                RADIOLOGY & ADDITIONAL STUDIES:

## 2024-04-26 NOTE — ED PROVIDER NOTE - CLINICAL SUMMARY MEDICAL DECISION MAKING FREE TEXT BOX
60yo male pt unknown PMHx who presents to the ED via EMS for cardiac arrest 20 mins prior to arrival. Patient had called ems for sob. Patient brought in EMS bagging the patient to oxygenate and another person starting compressions. 60yo male pt unknown PMHx who presents to the ED via EMS for cardiac arrest 20 mins prior to arrival. Patient had called ems for sob. Patient brought in EMS bagging the patient to oxygenate and another person starting compressions.    stanley: Patient presented in cardiac arrest BLS in progress.  EMS reported that there was a cough or shortness of breath and they found the patient collapsed outside of the home.  The home was at [address] [place] [address].  As per neighbors she had just moved in.  She had no id  on her.    Patient is currently 21-yke-nvfl-old woman.  Given she was patient was pulseless and not breathing on her own.  As per EMS they believe that she went down about 910 or 915.  She was intubated and ACLS was begun when she entered the emergency department.  Please look at ACLS timing she but she had episodes of PEA alternating with episodes with a pulse and episodes of V. tach.  She had treatment for V. tach with amiodarone for PEA we treated for presumed hyperkalemia with bicarb and calcium and insulin.  Sugar was in the 350 range.  And when she was pulseless she also received epi.  She had good lung motion so unlikely pneumothorax on ultrasound of her heart she had poor squeeze.  EKG done 939 showed acute STEMI cardiology was called.      In the meantime I used the address to reverse lookup and found someone who just recently bought a house at [address] [place] [address].  I was able to find the  who could be in contact with the [place] agent.  Real state agent Philomena manage ER now [phone] given the name of the person she sold the house to was Noa Jarquin.  I provided a physical description of the patient being that she had gray very curly hair was slim and the  said that that sounded like the person that she had sold the house to.  I was able get phone number for a brother of the person who brought the house Harinder alanis.  I informed him that his phone number at [phone] and also had his cell #[phone] that we had someone who we suspected was his sister in the emergency department who had had a cardiac arrest.  I was very clear that it may or may not be his sister but based on the information I had received it was likely.  He confirmed the physical description of his sister.  He said if it is his sister she had not been to the doctor in years she was a long-term smoker no known medical history because she had not been to a doctor in years.    After episodes of hypotension adding Levophed epi and eventually dobutamine to increase cardiac squeeze and episodes of loss of pulse the patient was able to maintain pulse long enough for cardiology to bring her to the Cath Lab.    Patient unresponsive upon presentation intubated upon presentation, quired meds for intubation    h at/nc  Mildly cyanotic   lungs clear to asno wheeze  abd soft no r/g/t  ext no edema no deformities, mildly mottled  neueo agonal breath   Vital signs as noted on code sheet    Patient brought to Cath Lab by cardiology  multiple ecg read by me in real time, rhytm strips.,  ecg 939 stemi, ant. sr.

## 2024-04-26 NOTE — H&P ADULT - ASSESSMENT
67 yo female  PMH DM, active smoker, BIBEMS for cardiac arrest 20 mins prior to arrival. Patient called EMS for sob. Patient found down by  EMS. BLS started, In ED, pt  intubated and ACLS initiated. Pt had episodes of PEA alternating with episodes with a pulse and episodes of Vtach.  Pt with severe metabolic derangement treated with  bicarb, calcium,  insulin and amiodarone.  Pressure support  with epinephrine and norepinephrine. EKG showed acute STEMI .Pt urgently brought to cath lab  S/p LHC pt found to have TVD without blockage. No intervention done.   Pt admitted to CCU s/p LHC: mLAD 80%, ostial 100%, L--L collat, pRCA 80%, mRCA 80-90, and WCW  - 36-40CO 6.4, CI 3.69, PA sat  57, hg 7-9 On Levo and epi. IABP 1:1 On pressors.                   Assessment  69 yo female  PMH DM, active smoker, BIBEMS for cardiac arrest 20 mins prior to arrival. Patient called EMS for sob. Patient found down by  EMS. BLS started, In ED, pt  intubated and ACLS initiated. Pt had episodes of PEA alternating with episodes with a pulse and episodes of Vtach.  Pt with severe metabolic derangement treated with  bicarb, calcium,  insulin and amiodarone.  Pressure support  with epinephrine and norepinephrine. EKG showed acute STEMI .Pt urgently brought to cath lab  S/p LHC pt found to have TVD without blockage. No intervention done.   Pt admitted to CCU s/p LHC: mLAD 80%, ostial 100%, L--L collat, pRCA 80%, mRCA 80-90, and WCW  - 36-40CO 6.4, CI 3.69, PA sat  57, hg 7-9 On Levo and epi. IABP 1:1 On pressors.      Plan  NEURO:  - sedated    RESPIRATORY:   Intubated  /35/100%/20  Pulmonary called    CARDIOVASCULAR:    #STEMI  Patient with ST elevations .....    Loaded with  mg now, Plavix 600 mg PO and heparin 5000 units IVP x 1 in ER  Discussed case with interventionalist. Patient s/p emergent cardiac cath.   Admit to CCU  Continue ASA 81 mg qd, Plavix 75mg qd  Start Atorvastatin 80 mg QD,   Introduce beta blocker and acei as tolerated Hold beta blocker given heart failure,  inferior wall MI  Admission labs include serial cardiac enzymes, risk factor profile to include TSH, HGBA1c, Lipid profile, CMP, Mag, Phos, CBC, pBNP  Serial EKG, PRN chest pain  Echo in AM to evaluate LV function and wall motion abnormality        GI/NUTRITION:  -Diet: Diabetic/DASH      #Elevated LFTs      GENITOURINARY/RENAL:  -Monitor electrolytes; replete PRN  -Monitor I/Os  - Keep K+ > 4.0 and Mg > 2.0      HEMATOLOGIC:    #Leukocytosis with neutrophil predominance  - stable leukocytosis latest (7/5) 13.78  - no fever  - Continue to monitor with daily CBC  - Pan culture if febrile    -DVT ppx:   Heparin gtt    INFECTIOUS DISEASE:  - Trend fever curve  - No indication for antibiotics at this time    ENDOCRINE:    #Elevated TSH  - TSH 5.52; T3 84; T4 5.56  -elevated TSH with low normal T3 T4 likely euthyroid sick syndrome    #Type 2 diabetes mellitus  - A1c      #Hyperlipidemia           Assessment  69 yo female  PMH DM, active smoker, BIBEMS for cardiac arrest 20 mins prior to arrival. Patient called EMS for sob. Patient found down by  EMS. BLS started, In ED, pt  intubated and ACLS initiated. Pt had episodes of PEA alternating with episodes with a pulse and episodes of Vtach.  Pt with severe metabolic derangement treated with  bicarb, calcium,  insulin and amiodarone.  Pressure support  with epinephrine and norepinephrine. EKG showed acute STEMI .Pt urgently brought to cath lab  S/p LHC pt found to have TVD without blockage. No intervention done.   Pt admitted to CCU s/p LHC: mLAD 80%, ostial 100%, L--L collat, pRCA 80%, mRCA 80-90, and WCW  - 36-40CO 6.4, CI 3.69, PA sat  57, hg 7-9 On Levo and epi. IABP 1:1 On pressors.      Plan  NEURO:  - sedated    RESPIRATORY:   Intubated  /35/100%/20  Pulmonary consulted    CARDIOVASCULAR:    #STEMI  Patient with ST elevations V1,V2,V3  LHC: mLAD 80%, ostial 100%, L--L collat, pRCA 80%, mRCA 80-90,     wedge - 36-40CO 6.4, CI 3.69, PA sat  57, hg 7-9 On Levo and epi. IABP 1:1 On pressors.  Admit to CCU    GI/NUTRITION:  - NPO    GENITOURINARY/RENAL:  -Monitor electrolytes; replete PRN  -Monitor I/Os  - Keep K+ > 4.0 and Mg > 2.0      -DVT ppx:   Heparin gtt    INFECTIOUS DISEASE:  - Trend fever curve  - No indication for antibiotics at this time    ENDOCRINE:    #Elevated TSH  - TSH 11.3  -Follow up  T3 T4 levels    #Type 2 diabetes mellitus  - A1c 5.4

## 2024-04-26 NOTE — ED ADULT NURSE NOTE - OBJECTIVE STATEMENT
Steve RN  received pt in room with CPR in progress by EMS, pt transferred to [facility], CPR resumed, place don monitor with asystole noted, O2 saturation noted to be 30%, please note documented O2 saturation of 50 which is minimal allowed value for [facility] documentation. Pt cyanotic in appearance,  cold to touch, with frothy sputum and brown emesis noted in the oral cavity, Oral cavity suctioned, manual ventilation via BVM initiated while ACLS protocol ongoing.  no obvious injuries noted on pt person, See code flow sheet for detailed interventions and times. Pt intubated with ETT size 7.5 at 23 cm lipline, Lung sounds present and equal B/l,  capnography readings ntoe dot be at 55 at the time of intubation. Multiple IVs initiated, attempted ot place pt on CPR assisting device, unable to align  pt for effective CPR initially, CPR continued manually by staff. Multiple ACLS medications given ( see flow sheet), pt had multiple episodes of ROSC and loss  of pulse.  At 10:04 pt had prolonged episode of sustained ROSC, at that time pt placed on CPR device, cardiology at bedside, pt being prepared for transfer to cath lab.   at 10:23 pt lost pulse, and CLS protocol resumed,   at 10:29 second prolonged ROSC obtained pt transferred to Cath lab at 1034 with sustained sinus rhythm.   Report given to cath lab team with  medications ongoing as follows: Epinephrine drip at 2 mcg/kg/min to right AC 16 G by pump, Norepinephrine drip ongoing to left AC 18G at 2 mcg/kg/min via pump. Fentanyl drip ongoing at 1 mcg/kg/min to left hand 16 G via pump, Bicarb drip ongoing at 150 ml/hr to left hand 16G by pump, Dobutamine drip ongoing at 5 mcg/kg/min to right hand 20G by pump, Heparin drip ongoing at 8 ml/hr by pump to right hand 16G. Please note assumed pt's weight for drip and titrations is 60 kg, assumed weight  for heparin infusion is 70 kg. Upon Sepulveda catheter insertion no UO noted.  while ALCS was ongoing at  around 10:15 pt developed blood tinged frothy discharge in ETT tube. Pt's belongings bag labeled and left in cath lab procedure room. Pt arrived with new jewelry present. Steve RN  received pt in room with CPR in progress by EMS, pt transferred to [facility], CPR resumed, place don monitor with asystole noted, O2 saturation noted to be 30%, please note documented O2 saturation of 50 which is minimal allowed value for [facility] documentation. Pt cyanotic in appearance,  cold to touch, with frothy sputum and brown emesis noted in the oral cavity, Oral cavity suctioned, manual ventilation via BVM initiated while ACLS protocol ongoing.  no obvious injuries noted on pt person, See code flow sheet for detailed interventions and times. Pt intubated with ETT size 7.5 at 23 cm lipline, Lung sounds present and equal B/l,  capnography readings ntoe dot be at 55 at the time of intubation. Vent setting 500, 5, 12, 100%. Multiple IVs initiated, attempted ot place pt on CPR assisting device, unable to align  pt for effective CPR initially, CPR continued manually by staff. Multiple ACLS medications given ( see flow sheet), pt had multiple episodes of ROSC and loss of pulse.  Multiple EKG completed during thym changes between brief episodes of ROCS.  At 10:04 pt had prolonged episode of sustained ROSC, at that time pt placed on CPR device, cardiology at bedside, pt being prepared for transfer to cath lab.   at 10:23 pt lost pulse, and CLS protocol resumed,   at 10:29 second prolonged ROSC obtained pt transferred to Cath lab at 1034 with sustained sinus rhythm.   Report given to cath lab team with  medications ongoing as follows: Epinephrine drip at 2 mcg/kg/min to right AC 16 G by pump, Norepinephrine drip ongoing to left AC 18G at 2 mcg/kg/min via pump. Fentanyl drip ongoing at 1 mcg/kg/min to left hand 16 G via pump, Bicarb drip ongoing at 150 ml/hr to left hand 16G by pump, Dobutamine drip ongoing at 5 mcg/kg/min to right hand 20G by pump, Heparin drip ongoing at 8 ml/hr by pump to right hand 16G. Please note assumed pt's weight for drip and titrations is 60 kg, assumed weight  for heparin infusion is 70 kg. Upon Sepulveda catheter insertion no UO noted.  while ALCS was ongoing at  around 10:15 pt developed blood tinged frothy discharge in ETT tube. Pt's belongings bag labeled and left in cath lab procedure room. Pt arrived with new jewelry present.

## 2024-04-26 NOTE — H&P ADULT - HISTORY OF PRESENT ILLNESS
62yo male pt unknown PMH DM, active smoker BIBEMS for cardiac arrest 20 mins prior to arrival. Patient called EMS for sob. Patient found down by  EMS. BLS started, bagging the patient to oxygenate and another person starting compressions.  In ED, pt  intubated and ACLS initiated. Pt had episodes of PEA alternating with episodes with a pulse and episodes of V. tach.  amiodarone given.  Pt with severe metabolic derangement treated with with bicarb and calcium and insulin.  Sugar was in the 350 range.   EKG showed acute STEMI Pt brought to cath lab  After episodes of hypotension adding Levophed epi and eventually dobutamine to increase cardiac squeeze and episodes of loss of pulse the patient was able to maintain pulse long enough for cardiology to bring her to the Cath Lab.     62yo male pt unknown PMH DM, active smoker BIBEMS for cardiac arrest 20 mins prior to arrival. Patient called EMS for sob. Patient found down by  EMS. BLS started, In ED, pt  intubated and ACLS initiated. Pt had episodes of PEA alternating with episodes with a pulse and episodes of Vtach.  Pt with severe metabolic derangement treated with with bicarb and calcium and insulin and amiodarone.  EKG showed acute STEMI .Pt urgently brought to cath lab  S/p C pt found to have TVD without blockage. No intervention doneLevophed epi and eventually dobutamine to increase cardiac squeeze and episodes of loss of pulse the patient was able to maintain pulse long enough for cardiology to bring her to the Cath Lab.     60yo male pt unknown PMH DM, active smoker BIBEMS for cardiac arrest 20 mins prior to arrival. Patient called EMS for sob. Patient found down by  EMS. BLS started, In ED, pt  intubated and ACLS initiated. Pt had episodes of PEA alternating with episodes with a pulse and episodes of Vtach.  Pt with severe metabolic derangement treated with with bicarb and calcium and insulin and amiodarone.  EKG showed acute STEMI .Pt urgently brought to cath lab  S/p C pt found to have TVD without blockage. No intervention doneLevophed epi and eventually dobutamine to increase cardiac squeeze and episodes of loss of pulse the patient was able to maintain pulse long enough for cardiology to bring her to the Cath Lab.  mLAD 80%, ostial 100%, L--L collat, pRCA 80%, mRCA 80-90,   CO 6.4, CI 3.69, PA sat  57, hg 7-9 On Levo and epi. IABP 1:1     69 yo female  PMH DM, active smoker, BIBEMS for cardiac arrest 20 mins prior to arrival. Patient called EMS for sob. Patient found down by  EMS. BLS started, In ED, pt  intubated and ACLS initiated. Pt had episodes of PEA alternating with episodes with a pulse and episodes of Vtach.  Pt with severe metabolic derangement treated with  bicarb, calcium,  insulin and amiodarone.  Pressure support  with epinephrine and norepinephrine. EKG showed acute STEMI .Pt urgently brought to cath lab  S/p LHC pt found to have TVD without blockage. No intervention done.   Pt admitted to CCU s/p LHC: mLAD 80%, ostial 100%, L--L collat, pRCA 80%, mRCA 80-90, and WCW  - 36-40CO 6.4, CI 3.69, PA sat  57, hg 7-9 On Levo and epi. IABP 1:1 On pressores     69 yo female  PMH DM, active smoker, BIBEMS for cardiac arrest 20 mins prior to arrival. Patient called EMS for sob. Patient found down by  EMS. BLS started, In ED, pt  intubated and ACLS initiated. Pt had episodes of PEA alternating with episodes with a pulse and episodes of Vtach.  Pt with severe metabolic derangement treated with  bicarb, calcium,  insulin and amiodarone.  Pressure support  with epinephrine and norepinephrine. EKG showed acute STEMI .Pt urgently brought to cath lab  S/p LHC pt found to have TVD without blockage. No intervention done.   Pt admitted to CCU s/p LHC: mLAD 80%, ostial 100%, L--L collat, pRCA 80%, mRCA 80-90, and WCW  - 36-40CO 6.4, CI 3.69, PA sat  57, hg 7-9 On Levo and epi. IABP 1:1 On pressors     69 yo female  PMH DM, active smoker, BIBEMS for cardiac arrest 20 mins prior to arrival. Patient called EMS for sob. Patient found down by  EMS. BLS started, In ED, pt  intubated and ACLS initiated. Pt had episodes of PEA alternating with episodes with a pulse and episodes of Vtach.  Pt with severe metabolic derangement treated with  bicarb, calcium,  insulin and amiodarone.  Pressure support  with epinephrine and norepinephrine. EKG showed acute STEMI .Pt urgently brought to cath lab  S/p LHC pt found to have TVD without blockage. No intervention done.   Pt admitted to CCU s/p LHC: mLAD 80%, ostial 100%, L--L collat, pRCA 80%, mRCA 80-90, and Wedge - 36-40, CO 6.4, CI 3.69, PA sat  57, hg 7-9 On Levo and epi. IABP 1:1 On pressors

## 2024-04-26 NOTE — PROVIDER CONTACT NOTE (EICU) - RECOMMENDATIONS
Case discussed between Dr. Herrera and Dr Vincent of [facility] surg for [facility] given patients condition and Shock Team via CTC call.      Given hemodynamic instability and unknown neurologic status at this time, will monitor for signs of improvement at [facility] CCU. CCU team to provide updates as needed for possible transfer.

## 2024-04-26 NOTE — ED ADULT NURSE NOTE - NSFALLHARMRISKINTERV_ED_ALL_ED

## 2024-04-26 NOTE — H&P ADULT - NS ATTEND AMEND GEN_ALL_CORE FT
Acutely ill patient in cardiogenic shock.  Cath with TVD, on pressors and IABP  Unable to maintain saturation  Shock call initiated, but not a candidate for ECMO at this point.

## 2024-05-05 LAB — GLUCOSE BLDC GLUCOMTR-MCNC: 172 MG/DL — HIGH (ref 70–99)
